# Patient Record
Sex: MALE | Race: WHITE | NOT HISPANIC OR LATINO | Employment: FULL TIME | URBAN - METROPOLITAN AREA
[De-identification: names, ages, dates, MRNs, and addresses within clinical notes are randomized per-mention and may not be internally consistent; named-entity substitution may affect disease eponyms.]

---

## 2017-07-17 ENCOUNTER — TELEPHONE (OUTPATIENT)
Dept: UROLOGY | Facility: CLINIC | Age: 32
End: 2017-07-17

## 2017-07-17 NOTE — TELEPHONE ENCOUNTER
----- Message from Niurka Schaefer sent at 7/17/2017  3:14 PM CDT -----  Contact: Patient  Patient needs a consult for Vasectomy and needs a 4 o'clock appointment. Please call patient at [phone] with appointment date and time. If you could fit him in before July 31 st then he could come in any time.

## 2017-08-22 ENCOUNTER — OFFICE VISIT (OUTPATIENT)
Dept: UROLOGY | Facility: CLINIC | Age: 32
End: 2017-08-22
Payer: COMMERCIAL

## 2017-08-22 VITALS
SYSTOLIC BLOOD PRESSURE: 142 MMHG | HEIGHT: 73 IN | DIASTOLIC BLOOD PRESSURE: 90 MMHG | WEIGHT: 315 LBS | HEART RATE: 110 BPM | RESPIRATION RATE: 18 BRPM | BODY MASS INDEX: 41.75 KG/M2 | TEMPERATURE: 98 F

## 2017-08-22 DIAGNOSIS — Z30.09 STERILIZATION CONSULT: Primary | ICD-10-CM

## 2017-08-22 LAB
BILIRUB SERPL-MCNC: NORMAL MG/DL
BLOOD URINE, POC: NORMAL
COLOR, POC UA: YELLOW
GLUCOSE UR QL STRIP: NORMAL
KETONES UR QL STRIP: NORMAL
LEUKOCYTE ESTERASE URINE, POC: NORMAL
NITRITE, POC UA: NORMAL
PH, POC UA: 5
PROTEIN, POC: NORMAL
SPECIFIC GRAVITY, POC UA: 1.02
UROBILINOGEN, POC UA: NORMAL

## 2017-08-22 PROCEDURE — 81002 URINALYSIS NONAUTO W/O SCOPE: CPT | Mod: S$GLB,,, | Performed by: UROLOGY

## 2017-08-22 PROCEDURE — 3008F BODY MASS INDEX DOCD: CPT | Mod: S$GLB,,, | Performed by: UROLOGY

## 2017-08-22 PROCEDURE — 99204 OFFICE O/P NEW MOD 45 MIN: CPT | Mod: 25,S$GLB,, | Performed by: UROLOGY

## 2017-08-22 PROCEDURE — 99999 PR PBB SHADOW E&M-EST. PATIENT-LVL III: CPT | Mod: PBBFAC,,, | Performed by: UROLOGY

## 2017-08-23 NOTE — PROGRESS NOTES
AGE:  32.    DRUG ALLERGIES:  Erythromycin and latex.    CHIEF COMPLAINT:  Elective vasectomy for sterilization.    HISTORY OF PRESENT ILLNESS:  This 32-year-old male is currently  with two   children, is coming requesting information concerning elective vasectomy for   sterilization as he and his wife did not want to have any more children at this   time.  His wife is on birth control pills, but they are continuing to use   condoms.  The patient has a negative  history.    PAST MEDICAL HISTORY:  Negative.    SURGICAL HISTORY:  Appendectomy, dental work.    PRESENT MEDICATIONS:  None.    FAMILY HISTORY:  One sister living and has been treated for brain tumor.    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  Tobacco none.  Alcohol none.    REVIEW OF SYMPTOMS:  GENERAL:  No weight change.  Good appetite.  HEAD AND NECK:  No headaches.  No visual problems.  CARDIORESPIRATORY:  No chest pain.  No shortness of breath.  No cough.  GASTROINTESTINAL:  No trouble swallowing.  No constipation or diarrhea.  MUSCULOSKELETAL:  No joint or back problems.  NEUROLOGIC:  No seizures.    PHYSICAL EXAMINATION:  VITAL SIGNS:  /90, pulse 110, temperature 98.4, respiration 18, weight   144.3 kilos, height 6 feet 1 inch.  GENERAL:  A well-developed, well-nourished 32-year-old male, alert, oriented,   cooperative, in no acute distress.  HEAD AND NECK:  Throat clear.  No adenopathy.  CHEST:  Clear.  HEART:  Regular, no murmur.  ABDOMEN:  Soft, benign and nontender.  No masses.  No hernias.  No organomegaly.  EXTERNAL GENITAL:  Normal phallus with adequate meatus.  No skin lesions.  No   Peyronie plaques.  Testes descended and feel normal.  Both vas and epididymis   are palpable and feel normal.  RECTAL:  A 20 to 25 g smooth prostate.  No nodules.  Normal sphincter tone.    URINALYSIS:  Negative with pH 5.0.    IMPRESSION:  Elective vasectomy.    RECOMMENDATION:  The procedure  with risks, side effects, complications,   expectation were discussed with the patient.  It was mentioned to him that the   vasectomy is not effective immediately and may sometimes take up to a couple of   months before he will be clear of all sperm and needs to take the same   precautions in terms of contraception until semen analyses are completely clear.    We will check his first semen analysis, approximately four to six weeks after   the procedure.  He was also informed of the studies questioning whether there is   increased risk of prostate cancer with vasectomy, although I did mention that   other studies did not come to the same conclusion.  He stated he understood and   agreed and would like to in any case proceed with the surgery.  We are   tentatively scheduling for the date of 08/31/2017 under local anesthesia at the   surgery suite.      ELBERT  dd: 08/22/2017 18:36:07 (CDT)  td: 08/22/2017 22:16:48 (CDT)  Doc ID   #[id number]  Job ID #[id number]    CC:

## 2017-08-30 NOTE — ADDENDUM NOTE
Encounter addended by: Payam Carter MD on: 8/30/2017 11:02 AM<BR>    Actions taken: Diagnosis association updated

## 2017-08-30 NOTE — H&P
DRUG ALLERGIES:  Erythromycin and latex.     CHIEF COMPLAINT:  Elective vasectomy for sterilization.     HISTORY OF PRESENT ILLNESS:  This 32-year-old male is currently  with two   children, is coming requesting information concerning elective vasectomy for   sterilization as he and his wife did not want to have any more children at this   time.  His wife is on birth control pills, but they are continuing to use   condoms.  The patient has a negative  history.     PAST MEDICAL HISTORY:  Negative.     SURGICAL HISTORY:  Appendectomy, dental work.     PRESENT MEDICATIONS:  None.     FAMILY HISTORY:  One sister living and has been treated for brain tumor.     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  Tobacco none.  Alcohol none.     REVIEW OF SYMPTOMS:  GENERAL:  No weight change.  Good appetite.  HEAD AND NECK:  No headaches.  No visual problems.  CARDIORESPIRATORY:  No chest pain.  No shortness of breath.  No cough.  GASTROINTESTINAL:  No trouble swallowing.  No constipation or diarrhea.  MUSCULOSKELETAL:  No joint or back problems.  NEUROLOGIC:  No seizures.     PHYSICAL EXAMINATION:  VITAL SIGNS:  /90, pulse 110, temperature 98.4, respiration 18, weight   144.3 kilos, height 6 feet 1 inch.  GENERAL:  A well-developed, well-nourished 32-year-old male, alert, oriented,   cooperative, in no acute distress.  HEAD AND NECK:  Throat clear.  No adenopathy.  CHEST:  Clear.  HEART:  Regular, no murmur.  ABDOMEN:  Soft, benign and nontender.  No masses.  No hernias.  No organomegaly.  EXTERNAL GENITAL:  Normal phallus with adequate meatus.  No skin lesions.  No   Peyronie plaques.  Testes descended and feel normal.  Both vas and epididymis   are palpable and feel normal.  RECTAL:  A 20 to 25 g smooth prostate.  No nodules.  Normal sphincter tone.     URINALYSIS:  Negative with pH 5.0.     IMPRESSION:  Elective vasectomy.     RECOMMENDATION:  The procedure with  risks, side effects, complications,   expectation were discussed with the patient.  It was mentioned to him that the   vasectomy is not effective immediately and may sometimes take up to a couple of   months before he will be clear of all sperm and needs to take the same   precautions in terms of contraception until semen analyses are completely clear.    We will check his first semen analysis, approximately four to six weeks after   the procedure.  He was also informed of the studies questioning whether there is   increased risk of prostate cancer with vasectomy, although I did mention that   other studies did not come to the same conclusion.  He stated he understood and   agreed and would like to in any case proceed with the surgery.  We are   tentatively scheduling for the date of 08/31/2017 under local anesthesia at the   surgery suite.

## 2017-08-31 ENCOUNTER — HOSPITAL ENCOUNTER (OUTPATIENT)
Facility: AMBULARY SURGERY CENTER | Age: 32
Discharge: HOME | End: 2017-08-31
Attending: UROLOGY | Admitting: UROLOGY
Payer: COMMERCIAL

## 2017-08-31 DIAGNOSIS — Z30.09 STERILIZATION CONSULT: ICD-10-CM

## 2017-08-31 PROCEDURE — 55250 REMOVAL OF SPERM DUCT(S): CPT | Performed by: UROLOGY

## 2017-08-31 PROCEDURE — 88302 TISSUE EXAM BY PATHOLOGIST: CPT | Performed by: PATHOLOGY

## 2017-08-31 PROCEDURE — 55250 REMOVAL OF SPERM DUCT(S): CPT | Mod: ,,, | Performed by: UROLOGY

## 2017-08-31 RX ORDER — CYANOCOBALAMIN (VITAMIN B-12) 1500 MCG
1 TABLET,CHEWABLE ORAL DAILY PRN
Refills: 0 | COMMUNITY
Start: 2017-08-31 | End: 2022-07-19

## 2017-08-31 RX ORDER — CEPHALEXIN 500 MG/1
500 CAPSULE ORAL 3 TIMES DAILY
Qty: 15 CAPSULE | Refills: 0 | Status: SHIPPED | OUTPATIENT
Start: 2017-08-31 | End: 2017-09-10

## 2017-08-31 RX ORDER — CEPHALEXIN 500 MG/1
500 CAPSULE ORAL 3 TIMES DAILY
Qty: 15 CAPSULE | Refills: 0 | Status: SHIPPED | OUTPATIENT
Start: 2017-08-31 | End: 2017-09-05 | Stop reason: ALTCHOICE

## 2017-08-31 RX ORDER — LIDOCAINE HYDROCHLORIDE 10 MG/ML
INJECTION, SOLUTION EPIDURAL; INFILTRATION; INTRACAUDAL; PERINEURAL
Status: DISCONTINUED
Start: 2017-08-31 | End: 2017-08-31 | Stop reason: HOSPADM

## 2017-08-31 RX ORDER — IBUPROFEN 200 MG
200 TABLET ORAL
COMMUNITY
End: 2022-07-19

## 2017-08-31 RX ORDER — HYDROCODONE BITARTRATE AND ACETAMINOPHEN 5; 325 MG/1; MG/1
1 TABLET ORAL EVERY 4 HOURS PRN
Status: DISCONTINUED | OUTPATIENT
Start: 2017-08-31 | End: 2017-08-31 | Stop reason: HOSPADM

## 2017-08-31 RX ORDER — MUPIROCIN 20 MG/G
OINTMENT TOPICAL
Status: DISCONTINUED
Start: 2017-08-31 | End: 2017-08-31 | Stop reason: WASHOUT

## 2017-08-31 RX ORDER — HYDROCODONE BITARTRATE AND ACETAMINOPHEN 5; 325 MG/1; MG/1
1 TABLET ORAL
Qty: 20 TABLET | Refills: 0 | Status: SHIPPED | OUTPATIENT
Start: 2017-08-31 | End: 2022-07-19

## 2017-08-31 RX ORDER — HYDROCODONE BITARTRATE AND ACETAMINOPHEN 5; 325 MG/1; MG/1
TABLET ORAL
Status: COMPLETED
Start: 2017-08-31 | End: 2017-08-31

## 2017-08-31 RX ORDER — LIDOCAINE HYDROCHLORIDE 10 MG/ML
INJECTION, SOLUTION EPIDURAL; INFILTRATION; INTRACAUDAL; PERINEURAL
Status: DISCONTINUED | OUTPATIENT
Start: 2017-08-31 | End: 2017-08-31 | Stop reason: HOSPADM

## 2017-08-31 RX ADMIN — HYDROCODONE BITARTRATE AND ACETAMINOPHEN 1 TABLET: 5; 325 TABLET ORAL at 05:08

## 2017-08-31 NOTE — INTERVAL H&P NOTE
The patient has been examined and the H&P has been reviewed:    I concur with the findings and no changes have occurred since H&P was written.    Anesthesia/Surgery risks, benefits and alternative options discussed and understood by patient/family.          Active Hospital Problems    Diagnosis  POA    Sterilization consult [Z30.09]  Yes      Resolved Hospital Problems    Diagnosis Date Resolved POA   No resolved problems to display.

## 2017-08-31 NOTE — DISCHARGE INSTRUCTIONS
Having a Vasectomy: Before, During, and After the Procedure     The cut ends of the vas may be tied, closed with a clip, or sealed by heat (cauterized).   Vasectomy is an outpatient (same day) procedure. It can be done in a doctors office, clinic, or hospital. Your doctor will talk with you about preparing for surgery. He or she will also discuss the possible risks and complications with you. After the procedure, follow your doctors advice for recovery.  During surgery  The entire procedure usually lasts less than 30 minutes.  ·  To prevent pain during surgery, youll be given an injection of local anesthetic in your scrotum or lower groin.  · Once the area is numb, one or two small incisions are made in the scrotum. This may be done with a scalpel or with a pointed clamp (no-scalpel method).  · The vas deferens are lifted through the incision and cut. The ends of the vas are then sealed off using one of several methods.  · If needed, the incision is closed with stitches.  · You can rest for a while until youre ready to go home.  Recovering at home  For about a week, your scrotum may look bruised and slightly swollen. You may also have a small amount of bloody discharge from the incision. This is normal.  To help make your recovery more comfortable, follow the tips below.  · Stay off your feet as much as possible for the first 2 days. Try to lie flat on a bed or sofa.  · Wear an athletic supporter or snug cotton briefs for support.  · Reduce swelling by placing an ice pack or bag of frozen peas in a thin towel. Then place the towel on your scrotum.   · Take medications with acetaminophen (such as Tylenol) to relieve any discomfort. Dont use aspirin, ibuprofen, or naproxen. Norco given at 505 pm  · Wait 48 hours before bathing.  · Avoid heavy lifting or exercise for 7 days.  · Ask your doctor how long to wait before having sex again. Remember: You must use another form of birth control until youre completely  sterile.  When to seek medical care  Call your doctor if you notice any of the following after surgery:  · Increasing pain or swelling in your scrotum  · A large black-and-blue area, or a growing lump  · Fever or chills  · Increasing redness or drainage of the incision  · Trouble urinating   Sex after vasectomy  Vasectomy doesnt change your sexual function. So when you start having sex again, it should feel the same as before. A vasectomy also shouldnt affect your relationship with your partner. Its important to remember, though, that you wont become sterile right away. It will take time before you can have sex without the need for birth control.  · Until youre sterile: After a vasectomy, some active sperm still remain in your semen. It will take time and many ejaculations before the sperm are completely gone. During this period, you must use another birth control method to prevent pregnancy. To make sure no sperm are left in your semen, youll need to have one or more semen exams. You usually collect a semen sample at home and bring it to a lab. The sample is then checked under a microscope. Youre sterile only when these samples show no evidence of sperm. Ask your doctor whether additional follow-up is needed.  · After youre sterile: After your doctor tells you youre sterile, you no longer need to use any form of birth control. Youre free to have sex without the fear of unwanted pregnancy. However, a vasectomy does not protect you from sexually transmitted diseases (STDs). If you have more than one sex partner, be sure to practice safer sex by using condoms.  Date Last Reviewed: 10/9/2014  © 8806-5075 [employer]. [address] [place] [address], [place] [address]. All rights reserved. This information is not intended as a substitute for professional medical care. Always follow your healthcare professional's instructions.

## 2017-08-31 NOTE — OP NOTE
VASECTOMY REPORT  Patient Name:  Vinayak Montiel III   YOB: 1985  DIAGNOSIS: Desired sterilization.    DATE: 8/31/2017    OPERATION: Bilateral vasectomy.    SURGEON: Dr. Carter    TECHNIQUE: No scalpel, 2 incisions, 2-0 chromic for ligature of the stumps. Removal of approximately 1 inch of vas from each side. Skin incision closed with 4-0 chromic.  Sheath of vas closed with 4-0 chromic over proximal stumps to separate stumps.  ANESTHESIA: Local Xylocaine.  FINDINGS: Normal vas.  COMPLICATIONS: None  PRECAUTION DISCUSSED: Rest, ice pack, no ejaculation for 5-7 days, activity restrictions, and protected intercourse until 2 negative sperm checks.    CURRENT MEDICATIONS:  Keflex 500 mg p.o. t.i.d. and Norco 5/325 every 4 hours prn pain.  Recheck in 1 week.

## 2017-08-31 NOTE — BRIEF OP NOTE
Operative Note     SUMMARY     Surgery Date: 8/31/2017     Surgeon(s) and Role:     * Payam Carter MD - Primary    Pre-op Diagnosis:  Sterilization consult [Z30.09]    Post-op Diagnosis:  Sterilization consult [Z30.09]    Procedure(s) (LRB):  VASECTOMY (Bilateral)    Anesthesia: Local    Findings/Key Components:  See Op Note      Estimated Blood Loss: * No values recorded between 8/31/2017  4:20 PM and 8/31/2017  5:05 PM *         Specimens     Start     Ordered    08/31/17 1636  Specimen to Pathology - Surgery  Once      08/31/17 1701            Discharge Note      SUMMARY     Admit Date: 8/31/2017    Attending Physician: Payam Carter MD     Discharge Physician: Payam Carter MD    Discharge Date: 8/31/2017     Final Diagnosis: Sterilization consult [Z30.09]    Hospital Course: uneventful, going home same day    Disposition: Home or Self Care    Patient Instructions:   Current Discharge Medication List      START taking these medications    Details   !! cephALEXin (KEFLEX) 500 MG capsule Take 1 capsule (500 mg total) by mouth 3 (three) times daily.  Qty: 15 capsule, Refills: 0      !! cephALEXin (KEFLEX) 500 MG capsule Take 1 capsule (500 mg total) by mouth 3 (three) times daily.  Qty: 15 capsule, Refills: 0      hydrocodone-acetaminophen 5-325mg (NORCO) 5-325 mg per tablet Take 1 tablet by mouth every 4 to 6 hours as needed for Pain.  Qty: 20 tablet, Refills: 0      ice bag Misc 1 Package by Misc.(Non-Drug; Combo Route) route daily as needed.  Refills: 0       !! - Potential duplicate medications found. Please discuss with provider.      CONTINUE these medications which have NOT CHANGED    Details   ibuprofen (ADVIL,MOTRIN) 200 MG tablet Take 200 mg by mouth as needed for Pain.             Discharge Procedure Orders (must include Diet, Follow-up, Activity)  Resume previous diet.  Follow up with PCP as needed.

## 2017-09-01 VITALS
OXYGEN SATURATION: 96 % | TEMPERATURE: 98 F | HEART RATE: 88 BPM | RESPIRATION RATE: 20 BRPM | HEIGHT: 73 IN | DIASTOLIC BLOOD PRESSURE: 78 MMHG | WEIGHT: 300 LBS | SYSTOLIC BLOOD PRESSURE: 121 MMHG | BODY MASS INDEX: 39.76 KG/M2

## 2017-09-05 ENCOUNTER — OFFICE VISIT (OUTPATIENT)
Dept: UROLOGY | Facility: CLINIC | Age: 32
End: 2017-09-05
Payer: COMMERCIAL

## 2017-09-05 VITALS
SYSTOLIC BLOOD PRESSURE: 143 MMHG | WEIGHT: 315 LBS | HEIGHT: 73 IN | DIASTOLIC BLOOD PRESSURE: 90 MMHG | HEART RATE: 111 BPM | TEMPERATURE: 98 F | RESPIRATION RATE: 18 BRPM | BODY MASS INDEX: 41.75 KG/M2

## 2017-09-05 DIAGNOSIS — Z30.09 STERILIZATION CONSULT: Primary | ICD-10-CM

## 2017-09-05 DIAGNOSIS — Z09 POSTOP CHECK: ICD-10-CM

## 2017-09-05 PROCEDURE — 99999 PR PBB SHADOW E&M-EST. PATIENT-LVL III: CPT | Mod: PBBFAC,,, | Performed by: UROLOGY

## 2017-09-05 PROCEDURE — 99024 POSTOP FOLLOW-UP VISIT: CPT | Mod: S$GLB,,, | Performed by: UROLOGY

## 2017-09-05 NOTE — PROGRESS NOTES
POST OPERATIVE UROLOGY NOTE    PATIENT NAME: Vinayak Montiel III  : 1985    PROCEDURE/DATE OF PROCEDURE:  Bilateral vasectomy on 2017    COMPLAINTS/COMMENTS: some expected soreness especially on the left side otherwise progressing well.  He went back to work today.    PHYSICAL EXAM: wounds clean and dry healing well; some slight swelling and tenderness on the left side cord    RECOMMENDATIONS: sitz baths, scrotal elevation, anti-inflammatories NSAIDS                                          Semen check in 4-6 weeks

## 2017-11-09 ENCOUNTER — TELEPHONE (OUTPATIENT)
Dept: UROLOGY | Facility: CLINIC | Age: 32
End: 2017-11-09

## 2017-11-09 NOTE — TELEPHONE ENCOUNTER
----- Message from Caroline Us sent at 11/9/2017  4:14 PM CST -----  Contact: Mariam Montiel  Needs to discuss follow/up samples that are needed after vasectomy. Please call back at

## 2018-04-05 ENCOUNTER — TELEPHONE (OUTPATIENT)
Dept: UROLOGY | Facility: CLINIC | Age: 33
End: 2018-04-05

## 2018-04-05 NOTE — TELEPHONE ENCOUNTER
Called and spoke with patient, informed the MD looked at his semen specimen and no sperm was seen, patient verbally understood.

## 2020-07-17 ENCOUNTER — HOSPITAL ENCOUNTER (OUTPATIENT)
Dept: PREADMISSION TESTING | Facility: HOSPITAL | Age: 35
Discharge: HOME | End: 2020-07-17
Attending: INTERNAL MEDICINE
Payer: COMMERCIAL

## 2020-07-17 ENCOUNTER — HOSPITAL ENCOUNTER (OUTPATIENT)
Dept: RADIOLOGY | Facility: HOSPITAL | Age: 35
Discharge: HOME | End: 2020-07-17
Attending: INTERNAL MEDICINE
Payer: COMMERCIAL

## 2020-07-17 DIAGNOSIS — R00.2 PALPITATIONS: ICD-10-CM

## 2020-07-17 DIAGNOSIS — R00.2 PALPITATIONS: Primary | ICD-10-CM

## 2020-07-17 DIAGNOSIS — R06.02 SHORTNESS OF BREATH: Primary | ICD-10-CM

## 2020-07-17 DIAGNOSIS — R06.02 SHORTNESS OF BREATH: ICD-10-CM

## 2020-07-17 PROCEDURE — 71046 X-RAY EXAM CHEST 2 VIEWS: CPT | Mod: TC

## 2020-07-17 PROCEDURE — 93005 ELECTROCARDIOGRAM TRACING: CPT | Performed by: INTERNAL MEDICINE

## 2020-08-24 ENCOUNTER — HOSPITAL ENCOUNTER (OUTPATIENT)
Dept: PREADMISSION TESTING | Facility: HOSPITAL | Age: 35
Discharge: HOME | End: 2020-08-24
Attending: INTERNAL MEDICINE
Payer: COMMERCIAL

## 2020-08-24 DIAGNOSIS — R94.31 NONSPECIFIC ABNORMAL ELECTROCARDIOGRAM (ECG) (EKG): Primary | ICD-10-CM

## 2020-08-24 DIAGNOSIS — R94.31 NONSPECIFIC ABNORMAL ELECTROCARDIOGRAM (ECG) (EKG): ICD-10-CM

## 2020-08-24 PROCEDURE — 93005 ELECTROCARDIOGRAM TRACING: CPT | Performed by: SPECIALIST

## 2021-12-17 DIAGNOSIS — G47.33 OSA (OBSTRUCTIVE SLEEP APNEA): Primary | ICD-10-CM

## 2021-12-28 ENCOUNTER — PROCEDURE VISIT (OUTPATIENT)
Dept: SLEEP MEDICINE | Facility: HOSPITAL | Age: 36
End: 2021-12-28
Attending: INTERNAL MEDICINE
Payer: COMMERCIAL

## 2021-12-28 DIAGNOSIS — G47.33 OSA (OBSTRUCTIVE SLEEP APNEA): ICD-10-CM

## 2021-12-28 PROCEDURE — 95806 SLEEP STUDY UNATT&RESP EFFT: CPT

## 2022-02-14 DIAGNOSIS — G47.33 OSA (OBSTRUCTIVE SLEEP APNEA): Primary | ICD-10-CM

## 2022-02-14 DIAGNOSIS — R53.83 LOSS OF ENERGY: ICD-10-CM

## 2022-02-14 DIAGNOSIS — R06.83 SNORING: ICD-10-CM

## 2022-02-14 DIAGNOSIS — Z01.818 OTHER SPECIFIED PRE-OPERATIVE EXAMINATION: ICD-10-CM

## 2022-02-20 ENCOUNTER — PROCEDURE VISIT (OUTPATIENT)
Dept: SLEEP MEDICINE | Facility: HOSPITAL | Age: 37
End: 2022-02-20
Attending: INTERNAL MEDICINE
Payer: COMMERCIAL

## 2022-02-20 DIAGNOSIS — R53.83 LOSS OF ENERGY: ICD-10-CM

## 2022-02-20 DIAGNOSIS — G47.33 OSA (OBSTRUCTIVE SLEEP APNEA): ICD-10-CM

## 2022-02-20 DIAGNOSIS — R06.83 SNORING: ICD-10-CM

## 2022-02-20 PROCEDURE — 95811 POLYSOM 6/>YRS CPAP 4/> PARM: CPT

## 2022-07-28 DIAGNOSIS — M79.672 PAIN IN LEFT FOOT: Primary | ICD-10-CM

## 2023-12-28 RX ORDER — PREDNISONE 5 MG/1
5 TABLET ORAL 2 TIMES DAILY
Qty: 10 TABLET | Refills: 0 | Status: SHIPPED | OUTPATIENT
Start: 2023-12-28 | End: 2024-01-02

## 2023-12-28 RX ORDER — DOXYCYCLINE 100 MG/1
100 CAPSULE ORAL 2 TIMES DAILY
Qty: 14 CAPSULE | Refills: 0 | Status: SHIPPED | OUTPATIENT
Start: 2023-12-28 | End: 2024-01-04

## (undated) DEVICE — DRESSING SPONGE 16PLY 4X4 NS

## (undated) DEVICE — SUPPORTER ADLT A3 3 IN. WB LA

## (undated) DEVICE — DRESSING N ADH OIL EMUL 3X3